# Patient Record
Sex: FEMALE | Race: WHITE | NOT HISPANIC OR LATINO | URBAN - METROPOLITAN AREA
[De-identification: names, ages, dates, MRNs, and addresses within clinical notes are randomized per-mention and may not be internally consistent; named-entity substitution may affect disease eponyms.]

---

## 2018-03-09 ENCOUNTER — OUTPATIENT (OUTPATIENT)
Dept: OUTPATIENT SERVICES | Facility: HOSPITAL | Age: 58
LOS: 1 days | End: 2018-03-09

## 2018-03-14 ENCOUNTER — OUTPATIENT (OUTPATIENT)
Dept: OUTPATIENT SERVICES | Facility: HOSPITAL | Age: 58
LOS: 1 days | End: 2018-03-14

## 2018-03-28 ENCOUNTER — OUTPATIENT (OUTPATIENT)
Dept: OUTPATIENT SERVICES | Facility: HOSPITAL | Age: 58
LOS: 1 days | End: 2018-03-28

## 2020-09-22 ENCOUNTER — APPOINTMENT (OUTPATIENT)
Dept: RADIOLOGY | Facility: CLINIC | Age: 60
End: 2020-09-22

## 2020-09-22 ENCOUNTER — APPOINTMENT (OUTPATIENT)
Dept: ULTRASOUND IMAGING | Facility: CLINIC | Age: 60
End: 2020-09-22
Payer: COMMERCIAL

## 2020-09-22 PROCEDURE — 73560 X-RAY EXAM OF KNEE 1 OR 2: CPT | Mod: 50

## 2020-09-22 PROCEDURE — 73100 X-RAY EXAM OF WRIST: CPT | Mod: 50

## 2020-09-22 PROCEDURE — 73120 X-RAY EXAM OF HAND: CPT | Mod: 50

## 2020-09-22 PROCEDURE — 76770 US EXAM ABDO BACK WALL COMP: CPT

## 2020-09-23 ENCOUNTER — INPATIENT (INPATIENT)
Facility: HOSPITAL | Age: 60
LOS: 3 days | Discharge: HOME | End: 2020-09-27
Admitting: INTERNAL MEDICINE
Payer: COMMERCIAL

## 2020-09-23 ENCOUNTER — OUTPATIENT (OUTPATIENT)
Dept: OUTPATIENT SERVICES | Facility: HOSPITAL | Age: 60
LOS: 1 days | End: 2020-09-23

## 2020-09-23 PROCEDURE — 74177 CT ABD & PELVIS W/CONTRAST: CPT | Mod: 26

## 2020-09-23 PROCEDURE — 71045 X-RAY EXAM CHEST 1 VIEW: CPT | Mod: 26

## 2020-09-23 PROCEDURE — 93010 ELECTROCARDIOGRAM REPORT: CPT

## 2020-09-23 PROCEDURE — 99285 EMERGENCY DEPT VISIT HI MDM: CPT

## 2020-09-24 ENCOUNTER — OUTPATIENT (OUTPATIENT)
Dept: OUTPATIENT SERVICES | Facility: HOSPITAL | Age: 60
LOS: 1 days | End: 2020-09-24

## 2020-09-25 ENCOUNTER — OUTPATIENT (OUTPATIENT)
Dept: OUTPATIENT SERVICES | Facility: HOSPITAL | Age: 60
LOS: 1 days | End: 2020-09-25

## 2020-09-26 ENCOUNTER — OUTPATIENT (OUTPATIENT)
Dept: OUTPATIENT SERVICES | Facility: HOSPITAL | Age: 60
LOS: 1 days | End: 2020-09-26

## 2020-09-27 ENCOUNTER — OUTPATIENT (OUTPATIENT)
Dept: OUTPATIENT SERVICES | Facility: HOSPITAL | Age: 60
LOS: 1 days | End: 2020-09-27

## 2021-02-24 ENCOUNTER — OUTPATIENT (OUTPATIENT)
Dept: OUTPATIENT SERVICES | Facility: HOSPITAL | Age: 61
LOS: 1 days | End: 2021-02-24

## 2021-02-28 DIAGNOSIS — Z01.818 ENCOUNTER FOR OTHER PREPROCEDURAL EXAMINATION: ICD-10-CM

## 2021-02-28 PROBLEM — Z00.00 ENCOUNTER FOR PREVENTIVE HEALTH EXAMINATION: Status: ACTIVE | Noted: 2021-02-28

## 2021-03-01 ENCOUNTER — APPOINTMENT (OUTPATIENT)
Dept: DISASTER EMERGENCY | Facility: CLINIC | Age: 61
End: 2021-03-01

## 2021-03-02 ENCOUNTER — TRANSCRIPTION ENCOUNTER (OUTPATIENT)
Age: 61
End: 2021-03-02

## 2021-03-02 LAB — SARS-COV-2 N GENE NPH QL NAA+PROBE: NOT DETECTED

## 2021-03-04 ENCOUNTER — OUTPATIENT (OUTPATIENT)
Dept: OUTPATIENT SERVICES | Facility: HOSPITAL | Age: 61
LOS: 1 days | End: 2021-03-04

## 2021-12-14 ENCOUNTER — OUTPATIENT (OUTPATIENT)
Dept: OUTPATIENT SERVICES | Facility: HOSPITAL | Age: 61
LOS: 1 days | End: 2021-12-14

## 2021-12-14 ENCOUNTER — INPATIENT (INPATIENT)
Facility: HOSPITAL | Age: 61
LOS: 1 days | Discharge: HOME | End: 2021-12-16
Payer: COMMERCIAL

## 2021-12-14 DIAGNOSIS — N13.6 PYONEPHROSIS: ICD-10-CM

## 2021-12-14 DIAGNOSIS — M06.9 RHEUMATOID ARTHRITIS, UNSPECIFIED: ICD-10-CM

## 2021-12-14 DIAGNOSIS — Z20.822 CONTACT WITH AND (SUSPECTED) EXPOSURE TO COVID-19: ICD-10-CM

## 2021-12-14 DIAGNOSIS — A41.9 SEPSIS, UNSPECIFIED ORGANISM: ICD-10-CM

## 2021-12-14 DIAGNOSIS — M32.9 SYSTEMIC LUPUS ERYTHEMATOSUS, UNSPECIFIED: ICD-10-CM

## 2021-12-14 DIAGNOSIS — F41.9 ANXIETY DISORDER, UNSPECIFIED: ICD-10-CM

## 2021-12-14 PROCEDURE — 99285 EMERGENCY DEPT VISIT HI MDM: CPT

## 2021-12-14 PROCEDURE — 71045 X-RAY EXAM CHEST 1 VIEW: CPT | Mod: 26

## 2021-12-14 PROCEDURE — 74177 CT ABD & PELVIS W/CONTRAST: CPT | Mod: 26

## 2021-12-15 ENCOUNTER — OUTPATIENT (OUTPATIENT)
Dept: OUTPATIENT SERVICES | Facility: HOSPITAL | Age: 61
LOS: 1 days | End: 2021-12-15

## 2021-12-16 ENCOUNTER — OUTPATIENT (OUTPATIENT)
Dept: OUTPATIENT SERVICES | Facility: HOSPITAL | Age: 61
LOS: 1 days | End: 2021-12-16

## 2021-12-28 ENCOUNTER — NON-APPOINTMENT (OUTPATIENT)
Age: 61
End: 2021-12-28

## 2021-12-30 ENCOUNTER — APPOINTMENT (OUTPATIENT)
Dept: FAMILY MEDICINE | Facility: CLINIC | Age: 61
End: 2021-12-30
Payer: COMMERCIAL

## 2021-12-30 ENCOUNTER — APPOINTMENT (OUTPATIENT)
Dept: UROLOGY | Facility: CLINIC | Age: 61
End: 2021-12-30
Payer: COMMERCIAL

## 2021-12-30 ENCOUNTER — NON-APPOINTMENT (OUTPATIENT)
Age: 61
End: 2021-12-30

## 2021-12-30 VITALS
SYSTOLIC BLOOD PRESSURE: 115 MMHG | TEMPERATURE: 97.6 F | HEART RATE: 66 BPM | WEIGHT: 130 LBS | BODY MASS INDEX: 24.55 KG/M2 | HEIGHT: 61 IN | DIASTOLIC BLOOD PRESSURE: 79 MMHG

## 2021-12-30 DIAGNOSIS — Z87.39 PERSONAL HISTORY OF OTHER DISEASES OF THE MUSCULOSKELETAL SYSTEM AND CONNECTIVE TISSUE: ICD-10-CM

## 2021-12-30 DIAGNOSIS — M32.9 SYSTEMIC LUPUS ERYTHEMATOSUS, UNSPECIFIED: ICD-10-CM

## 2021-12-30 DIAGNOSIS — Z78.9 OTHER SPECIFIED HEALTH STATUS: ICD-10-CM

## 2021-12-30 DIAGNOSIS — M25.619 STIFFNESS OF UNSPECIFIED SHOULDER, NOT ELSEWHERE CLASSIFIED: ICD-10-CM

## 2021-12-30 DIAGNOSIS — Z86.59 PERSONAL HISTORY OF OTHER MENTAL AND BEHAVIORAL DISORDERS: ICD-10-CM

## 2021-12-30 DIAGNOSIS — Z86.69 PERSONAL HISTORY OF OTHER DISEASES OF THE NERVOUS SYSTEM AND SENSE ORGANS: ICD-10-CM

## 2021-12-30 PROCEDURE — 99204 OFFICE O/P NEW MOD 45 MIN: CPT

## 2021-12-30 PROCEDURE — 36415 COLL VENOUS BLD VENIPUNCTURE: CPT

## 2021-12-30 RX ORDER — BUPROPION HYDROCHLORIDE 300 MG/1
300 TABLET, EXTENDED RELEASE ORAL DAILY
Refills: 0 | Status: ACTIVE | COMMUNITY

## 2021-12-30 NOTE — ASSESSMENT
[FreeTextEntry1] : Plan\par UA\par culture\par BMP\par pt to try and bring older CT scans\par fu with Dr. Arias

## 2021-12-30 NOTE — LETTER BODY
[Dear  ___] : Dear  [unfilled], [Consult Letter:] : I had the pleasure of evaluating your patient, [unfilled]. [Please see my note below.] : Please see my note below. [Consult Closing:] : Thank you very much for allowing me to participate in the care of this patient.  If you have any questions, please do not hesitate to contact me. [Sincerely,] : Sincerely, [FreeTextEntry3] : Alton Bhatti, DO\par Genitourinary Medicine\par

## 2021-12-30 NOTE — HISTORY OF PRESENT ILLNESS
[None] : no symptoms [Urinary Urgency] : urinary urgency [Urinary Frequency] : urinary frequency [FreeTextEntry1] : Pt. is here today with a diagnosis of a UPJ obstruction that was found about 1 year ago. Pt was hospitalized last year for a UTI and became septic and was transported to [facility] [place] and was admitted for 4 days. Pt currently not in any pain. December CT from [facility] [place] images reviewed.

## 2022-01-03 LAB
ANION GAP SERPL CALC-SCNC: 11 MMOL/L
APPEARANCE: CLEAR
BACTERIA UR CULT: NORMAL
BACTERIA: NEGATIVE
BILIRUBIN URINE: NEGATIVE
BLOOD URINE: NEGATIVE
BUN SERPL-MCNC: 18 MG/DL
CALCIUM SERPL-MCNC: 10.2 MG/DL
CHLORIDE SERPL-SCNC: 102 MMOL/L
CO2 SERPL-SCNC: 27 MMOL/L
COLOR: YELLOW
CREAT SERPL-MCNC: 1.09 MG/DL
GLUCOSE QUALITATIVE U: NEGATIVE
GLUCOSE SERPL-MCNC: 95 MG/DL
HYALINE CASTS: 0 /LPF
KETONES URINE: NEGATIVE
LEUKOCYTE ESTERASE URINE: NEGATIVE
MICROSCOPIC-UA: NORMAL
NITRITE URINE: NEGATIVE
PH URINE: 8.5
POTASSIUM SERPL-SCNC: 5.1 MMOL/L
PROTEIN URINE: NORMAL
RED BLOOD CELLS URINE: 2 /HPF
SODIUM SERPL-SCNC: 140 MMOL/L
SPECIFIC GRAVITY URINE: 1.02
SQUAMOUS EPITHELIAL CELLS: 5 /HPF
UROBILINOGEN URINE: NORMAL
WHITE BLOOD CELLS URINE: 1 /HPF

## 2022-01-05 ENCOUNTER — APPOINTMENT (OUTPATIENT)
Dept: UROLOGY | Facility: CLINIC | Age: 62
End: 2022-01-05
Payer: COMMERCIAL

## 2022-01-05 VITALS
WEIGHT: 130 LBS | DIASTOLIC BLOOD PRESSURE: 81 MMHG | BODY MASS INDEX: 24.55 KG/M2 | TEMPERATURE: 97.8 F | HEART RATE: 67 BPM | HEIGHT: 61 IN | SYSTOLIC BLOOD PRESSURE: 116 MMHG

## 2022-01-05 DIAGNOSIS — N12 TUBULO-INTERSTITIAL NEPHRITIS, NOT SPECIFIED AS ACUTE OR CHRONIC: ICD-10-CM

## 2022-01-05 DIAGNOSIS — N13.30 UNSPECIFIED HYDRONEPHROSIS: ICD-10-CM

## 2022-01-05 PROCEDURE — 99215 OFFICE O/P EST HI 40 MIN: CPT

## 2022-01-05 NOTE — PHYSICAL EXAM

## 2022-01-05 NOTE — HISTORY OF PRESENT ILLNESS
[None] : no symptoms [Urinary Urgency] : urinary urgency [Urinary Frequency] : urinary frequency [FreeTextEntry1] : 61 female with a diagnosis of a right sided UPJ obstruction that was found about 1 year ago - in 2020\par \par Pt was hospitalized last year for a UTI and became septic and was transported to [facility] and was admitted for 4 days.\par Within 14 months: twice admission to hospital for sepsis - needed IV antibiotics.\par Multiple CT's from 2014 to 2020 to 2021:\par All CT's show severe right hydro due to UPJ. Severe cortical thinning. \par MAG scan in 02/2021 at [facility]:\par T1/2 right side : not calculated: no excretion due to right UPJ   vs. Left side T1/2= 6 mins normal\par Diff function: right side 56% vs 44% left side (aberrant ??? due to obstruction function is false?)\par \par Dec 30, 2021: Cr =1 // GFR 56\par UCx: dec 30 2021: neg\par \par

## 2022-01-05 NOTE — LETTER BODY
[Dear  ___] : Dear  [unfilled], [Consult Letter:] : I had the pleasure of evaluating your patient, [unfilled]. [Please see my note below.] : Please see my note below. [Consult Closing:] : Thank you very much for allowing me to participate in the care of this patient.  If you have any questions, please do not hesitate to contact me. [Sincerely,] : Sincerely, [FreeTextEntry3] : Kerrie Arias MD\par Urologic Oncology\par Robotic & Endoscopic urology\par [facility] [place]\par [facility]\par \par

## 2022-01-05 NOTE — ASSESSMENT
[FreeTextEntry1] : 61 female with a diagnosis of a right sided UPJ obstruction found since 2014\par Within 14 months: twice admission to hospital for sepsis - needed IV antibiotics.\par All CT's show severe right hydro due to UPJ. Severe cortical thinning. \par MAG scan in 02/2021 at [facility]: T1/2 right side : not calculated: no excretion due to right UPJ   vs. Left side T1/2= 6 mins normal . Diff function: right side 56% vs 44% left side (aberrant ??? due to obstruction function is false?)\par Cr =1 // GFR 56\par UCx: dec 30 2021: neg\par severe right CVA tend\par All images, CTs, NM MAG scans, from 2014 have been reviewed.\par Path discussed: UPJ obstruction, sepsis, atrophic right kidney. all risks and benefits explained with regards to options mentioned below. stent vs pyeloplasty vs right nephrectomy\par \par Plan\par Right ureteral stent insertion and repeat NM MAG scan in 4 weeks after.\par if function is adequate with stent and symptoms improve: could do robo right pyeloplasty\par if not, right nephrectomy

## 2022-02-01 DIAGNOSIS — A41.9 SEPSIS, UNSPECIFIED ORGANISM: ICD-10-CM

## 2022-02-01 DIAGNOSIS — N39.0 URINARY TRACT INFECTION, SITE NOT SPECIFIED: ICD-10-CM

## 2022-02-14 ENCOUNTER — OUTPATIENT (OUTPATIENT)
Dept: OUTPATIENT SERVICES | Facility: HOSPITAL | Age: 62
LOS: 1 days | End: 2022-02-14
Payer: COMMERCIAL

## 2022-02-14 PROCEDURE — 93010 ELECTROCARDIOGRAM REPORT: CPT

## 2022-02-15 DIAGNOSIS — Z01.812 ENCOUNTER FOR PREPROCEDURAL LABORATORY EXAMINATION: ICD-10-CM

## 2022-02-15 DIAGNOSIS — N13.5 CROSSING VESSEL AND STRICTURE OF URETER WITHOUT HYDRONEPHROSIS: ICD-10-CM

## 2022-02-15 DIAGNOSIS — Z01.810 ENCOUNTER FOR PREPROCEDURAL CARDIOVASCULAR EXAMINATION: ICD-10-CM

## 2022-02-18 ENCOUNTER — APPOINTMENT (OUTPATIENT)
Dept: UROLOGY | Facility: HOSPITAL | Age: 62
End: 2022-02-18

## 2022-02-18 ENCOUNTER — OUTPATIENT (OUTPATIENT)
Dept: OUTPATIENT SERVICES | Facility: HOSPITAL | Age: 62
LOS: 1 days | End: 2022-02-18
Payer: COMMERCIAL

## 2022-02-18 PROCEDURE — 52332 CYSTOSCOPY AND TREATMENT: CPT | Mod: RT

## 2022-02-18 PROCEDURE — 74420 UROGRAPHY RTRGR +-KUB: CPT | Mod: 26

## 2022-02-25 DIAGNOSIS — N20.1 CALCULUS OF URETER: ICD-10-CM

## 2022-03-15 ENCOUNTER — OUTPATIENT (OUTPATIENT)
Dept: OUTPATIENT SERVICES | Facility: HOSPITAL | Age: 62
LOS: 1 days | End: 2022-03-15

## 2022-03-15 ENCOUNTER — NON-APPOINTMENT (OUTPATIENT)
Age: 62
End: 2022-03-15

## 2022-03-15 ENCOUNTER — APPOINTMENT (OUTPATIENT)
Dept: UROLOGY | Facility: CLINIC | Age: 62
End: 2022-03-15
Payer: COMMERCIAL

## 2022-03-15 VITALS
HEIGHT: 61 IN | HEART RATE: 73 BPM | BODY MASS INDEX: 24.55 KG/M2 | SYSTOLIC BLOOD PRESSURE: 105 MMHG | WEIGHT: 130 LBS | DIASTOLIC BLOOD PRESSURE: 68 MMHG | TEMPERATURE: 97.5 F

## 2022-03-15 DIAGNOSIS — T83.84XA PAIN DUE GENIURINARY PROSTHETIC DEVICES, IMPLANTS AND GRAFTS, INITIAL ENCOUNTER: ICD-10-CM

## 2022-03-15 DIAGNOSIS — Z53.8 PROCEDURE AND TREATMENT NOT CARRIED OUT FOR OTHER REASONS: ICD-10-CM

## 2022-03-15 DIAGNOSIS — N39.0 URINARY TRACT INFECTION, SITE NOT SPECIFIED: ICD-10-CM

## 2022-03-15 DIAGNOSIS — R30.0 DYSURIA: ICD-10-CM

## 2022-03-15 LAB
BILIRUB UR QL STRIP: NORMAL
CLARITY UR: CLEAR
COLLECTION METHOD: NORMAL
GLUCOSE UR-MCNC: NORMAL
HCG UR QL: 0.2 EU/DL
HGB UR QL STRIP.AUTO: NORMAL
KETONES UR-MCNC: NORMAL
LEUKOCYTE ESTERASE UR QL STRIP: NORMAL
NITRITE UR QL STRIP: NORMAL
PH UR STRIP: 7
PROT UR STRIP-MCNC: 100
SP GR UR STRIP: 1.01

## 2022-03-15 PROCEDURE — 99214 OFFICE O/P EST MOD 30 MIN: CPT

## 2022-03-15 PROCEDURE — 81003 URINALYSIS AUTO W/O SCOPE: CPT | Mod: QW

## 2022-03-15 NOTE — HISTORY OF PRESENT ILLNESS
[FreeTextEntry1] : Pt comes in bc of lower back pain, dark urine, cloudy bubbly. Pt was suppose to renal scan today but it was canceled bc of her UTI symptoms. Dr. Arias placed a right ureteral stent on 2/18/22.

## 2022-03-17 LAB
APPEARANCE: CLEAR
BACTERIA UR CULT: NORMAL
BACTERIA: NEGATIVE
BILIRUBIN URINE: NEGATIVE
BLOOD URINE: ABNORMAL
COLOR: NORMAL
GLUCOSE QUALITATIVE U: NEGATIVE
HYALINE CASTS: 2 /LPF
KETONES URINE: NEGATIVE
LEUKOCYTE ESTERASE URINE: ABNORMAL
MICROSCOPIC-UA: NORMAL
NITRITE URINE: NEGATIVE
PH URINE: 7
PROTEIN URINE: ABNORMAL
RED BLOOD CELLS URINE: 1 /HPF
SPECIFIC GRAVITY URINE: 1.01
SQUAMOUS EPITHELIAL CELLS: 0 /HPF
UROBILINOGEN URINE: NORMAL
WHITE BLOOD CELLS URINE: 34 /HPF

## 2022-03-23 ENCOUNTER — RESULT REVIEW (OUTPATIENT)
Age: 62
End: 2022-03-23

## 2022-03-23 ENCOUNTER — OUTPATIENT (OUTPATIENT)
Dept: OUTPATIENT SERVICES | Facility: HOSPITAL | Age: 62
LOS: 1 days | End: 2022-03-23
Payer: COMMERCIAL

## 2022-03-23 DIAGNOSIS — N13.30 UNSPECIFIED HYDRONEPHROSIS: ICD-10-CM

## 2022-03-23 PROCEDURE — 78708 K FLOW/FUNCT IMAGE W/DRUG: CPT | Mod: 26

## 2022-04-04 ENCOUNTER — APPOINTMENT (OUTPATIENT)
Dept: UROLOGY | Facility: CLINIC | Age: 62
End: 2022-04-04
Payer: COMMERCIAL

## 2022-04-04 VITALS
HEART RATE: 76 BPM | HEIGHT: 61 IN | TEMPERATURE: 92.3 F | SYSTOLIC BLOOD PRESSURE: 120 MMHG | DIASTOLIC BLOOD PRESSURE: 73 MMHG | WEIGHT: 130 LBS | OXYGEN SATURATION: 99 % | BODY MASS INDEX: 24.55 KG/M2

## 2022-04-04 DIAGNOSIS — N28.9 DISORDER OF KIDNEY AND URETER, UNSPECIFIED: ICD-10-CM

## 2022-04-04 DIAGNOSIS — N13.30 UNSPECIFIED HYDRONEPHROSIS: ICD-10-CM

## 2022-04-04 DIAGNOSIS — N13.5 CROSSING VESSEL AND STRICTURE OF URETER W/OUT HYDRONEPHROSIS: ICD-10-CM

## 2022-04-04 PROCEDURE — 99214 OFFICE O/P EST MOD 30 MIN: CPT

## 2022-04-04 NOTE — ASSESSMENT
[FreeTextEntry1] : Patient had 2 episodes of sever right pyonephrosis with urosepsis\par CT was done in December 2021 that showed normal left kidney and right kidney with sever hydronephrosis and very thin parenchyma\par NM Renal scan in 2021 showed decreased perfusion of thin cortical rim of right kidney and complete obstruction of UPJ. Left kidney is normal\par Patient got right double J stent and returned on NM Renal scan that was done with MAG -3. The "recalculated" results of renal scan showed that left kidney  contributes 72% and right kidney donates 28% of mutual renal function. No response on Lasix identified on the right side - this is with the presence of Right Ureteral stent. This also support the conclusion that the right kidney is a poorly functioning renal unit.\par \par I explained to the patient that MAG -3 could not accurately calculate the function of right kidney. To assess the accurate function DMSA would be a better chance.\par However, based on the results of CT and both NM renal scan we can consider right kidney as a poorly functioning I explained that the results of UPJ plasty is worse in the cases of poorly functioning kidney and alsothe age has a certain negative impact\par I recommended to consider right nephrectomy .\par Sending patient for the second opinion and operation\par \par

## 2022-04-04 NOTE — PHYSICAL EXAM

## 2022-04-04 NOTE — HISTORY OF PRESENT ILLNESS
[FreeTextEntry1] : 62 year-old patient presented to discuss the results of NM REnal scan with Lasix (MAG-3)\par Patient was previously seen in our office:\par had 2 episodes of sever right pyonephrosis with urosepsis\par CT was done in December 2021 that showed normal left kidney and right kidney with sever hydronephrosis and very thin parenchyma\par NM Renal scan in 2021 showed decreased perfusion of thin cortical rim of right kidney and complete obstruction of UPJ. Left kidney is normal\par Patient got right double J stent and returned on NM Renal scan that was done with MAG -3

## 2022-04-13 ENCOUNTER — APPOINTMENT (OUTPATIENT)
Dept: UROLOGY | Facility: CLINIC | Age: 62
End: 2022-04-13

## 2023-07-12 ENCOUNTER — APPOINTMENT (OUTPATIENT)
Dept: ULTRASOUND IMAGING | Facility: CLINIC | Age: 63
End: 2023-07-12
Payer: COMMERCIAL

## 2023-07-12 PROCEDURE — 76770 US EXAM ABDO BACK WALL COMP: CPT

## 2024-02-05 ENCOUNTER — APPOINTMENT (OUTPATIENT)
Dept: ORTHOPEDIC SURGERY | Facility: CLINIC | Age: 64
End: 2024-02-05
Payer: COMMERCIAL

## 2024-02-05 VITALS — HEIGHT: 61 IN | WEIGHT: 134 LBS | BODY MASS INDEX: 25.3 KG/M2

## 2024-02-05 DIAGNOSIS — Z87.19 PERSONAL HISTORY OF OTHER DISEASES OF THE DIGESTIVE SYSTEM: ICD-10-CM

## 2024-02-05 DIAGNOSIS — Z86.59 PERSONAL HISTORY OF OTHER MENTAL AND BEHAVIORAL DISORDERS: ICD-10-CM

## 2024-02-05 PROCEDURE — ZZZZZ: CPT

## 2024-02-05 RX ORDER — FLUOXETINE HYDROCHLORIDE 20 MG/1
20 CAPSULE ORAL
Refills: 0 | Status: ACTIVE | COMMUNITY

## 2024-02-05 RX ORDER — CEPHALEXIN 500 MG/1
500 CAPSULE ORAL
Qty: 14 | Refills: 0 | Status: DISCONTINUED | COMMUNITY
Start: 2022-03-15 | End: 2024-02-05

## 2024-02-05 RX ORDER — TAMSULOSIN HYDROCHLORIDE 0.4 MG/1
0.4 CAPSULE ORAL
Qty: 30 | Refills: 1 | Status: DISCONTINUED | COMMUNITY
Start: 2022-03-15 | End: 2024-02-05

## 2024-02-05 RX ORDER — FOLIC ACID 20 MG
CAPSULE ORAL DAILY
Refills: 0 | Status: DISCONTINUED | COMMUNITY
End: 2024-02-05

## 2024-02-05 RX ORDER — LUBIPROSTONE 24 UG/1
24 CAPSULE ORAL
Refills: 0 | Status: ACTIVE | COMMUNITY

## 2024-02-05 RX ORDER — METHOTREXATE 2.5 MG/1
2.5 TABLET ORAL
Qty: 1 | Refills: 0 | Status: DISCONTINUED | COMMUNITY
End: 2024-02-05

## 2024-02-05 NOTE — HISTORY OF PRESENT ILLNESS
[5] : 5 [3] : 3 [Dull/Aching] : dull/aching [Radiating] : radiating [Tightness] : tightness [Constant] : constant [Massage] : massage [Physical therapy] : physical therapy [Injection therapy] : injection therapy [Retired] : Work status: retired [de-identified] : 02/05/2024 - 63-year-old female is presenting for an initial evaluation with a complaint of left greater than right-sided neck pain persisting for several years. The neck pain does not radiate into the upper extremities, and there is no reported change in upper extremity strength, numbness, or tingling. She describes the pain as a constant sensation of stiffness and tightness. The pain worsens with cervical extension and at nighttime. Over the past two years, she has undergone various treatments, including acupuncture, physical therapy, trigger point injection therapy, and medical massage. While all these interventions provide temporary relief at the time, the pain persists. She states prior treatment with Naproxen was helpful, but she is no longer treating with this. NSAID was found to be affecting renal function so she has discontinued use with NSAID. Her overall general medical health is reported as good. [] : no [FreeTextEntry7] : bl shoulders [de-identified] : acupuncture, deep tissue massage, chiropractor, osteopathic-tpi [de-identified] : mri c-spine doarchie at

## 2024-02-05 NOTE — DATA REVIEWED
[MRI] : MRI [Cervical Spine] : cervical spine [Report was reviewed and noted in the chart] : The report was reviewed and noted in the chart [I reviewed the films/CD and additionally noted] : I reviewed the films/CD and additionally noted [I independently reviewed and interpreted images and report] : I independently reviewed and interpreted images and report [FreeTextEntry2] : in office x-rays cervical spine ap/lat  flex ex demonstrate extension there is 1 mm anterolisthesis C3/4 and in flexion there is 3 mm.   [FreeTextEntry1] : I stop paperwork reviewed

## 2024-02-05 NOTE — DISCUSSION/SUMMARY
[de-identified] : MRI - cervical MRI nanci c3/4 sev rt mild lt foraminal narrowing. c4/5 mod lt foraminal narrowing. c5/6 sev b/l foraminal narrowing. c6/7 sev lt foraminal narrowing.  Xray today cervical, extension there is 1 mm anterolisthesis C3/4 and in flexion there is 3 mm.  Plan: At this time, in the absence of upper extremity radicular pain, I do not think he would be a good candidate for cervical fusion surgery to address, primarily axial neck pain. Will remain conservative at this time. More urgency for surgery (ACDF) if there is onset of motor or neurological deficits. Given the persistent nature of the neck pain, the next recommended course of treatment would involve interventional injections, specifically medial branch blocks, possible RFA. (C3-4, C5-6,6-7 are potential pain generators) A referral has been issued. Use of NSAIDs is limited due to their impact on renal function in this patient.   Prior to appointment and during encounter with patient extensive medical records were reviewed including but not limited to, hospital records, outpatient records, imaging results, and lab data. During this appointment the patient was examined, diagnoses were discussed and explained in a face to face manner. In addition extensive time was spent reviewing aforementioned diagnostic studies. Counseling including abnormal image results, differential diagnoses, treatment options, risk and benefits, lifestyle changes, current condition, and current medications was performed. Patient's comments, questions, and concerns were addressed and patient verbalized understanding. Based on this patient's presentation at our office, which is an orthopedic spine surgeon's office, this patient inherently / intrinsically has a risk, however minute, of developing issues such as Cauda equina syndrome, bowel and bladder changes, or progression of motor or neurological deficits such as paralysis which may be permanent.  SHANNAN MICHAEL Acting as a Scribe for Dr. Ian HOWARD, Shannan Michael, attest that this documentation has been prepared under the direction and in the presence of Provider Antoine Sosa MD.

## 2024-02-05 NOTE — PHYSICAL EXAM
[Normal Coordination] : normal coordination [Normal DTR UE/LE] : normal DTR UE/LE  [Normal Sensation] : normal sensation [Normal Mood and Affect] : normal mood and affect [Orientated] : orientated [Able to Communicate] : able to communicate [Normal Skin] : normal skin [No Rash] : no rash [No Ulcers] : no ulcers [No Lesions] : no lesions [No obvious lymphadenopathy in areas examined] : no obvious lymphadenopathy in areas examined [Well Developed] : well developed [Peripheral vascular exam is grossly normal] : peripheral vascular exam is grossly normal [No Respiratory Distress] : no respiratory distress [NL (45)] : right lateral flexion 45 degrees [NL (80)] : right lateral rotation 80 degrees [Extension] : extension [5___] : right grasp 5[unfilled]/5 [Biceps 2+] : biceps 2+ [Triceps 2+] : triceps 2+ [Brachioradialis 2+] : brachioradialis 2+ [] : no rhomboid tenderness [FreeTextEntry9] : stiffness in all planes

## 2024-03-11 ENCOUNTER — APPOINTMENT (OUTPATIENT)
Dept: PAIN MANAGEMENT | Facility: CLINIC | Age: 64
End: 2024-03-11
Payer: COMMERCIAL

## 2024-03-11 VITALS — BODY MASS INDEX: 25.49 KG/M2 | WEIGHT: 135 LBS | HEIGHT: 61 IN

## 2024-03-11 PROCEDURE — 99203 OFFICE O/P NEW LOW 30 MIN: CPT

## 2024-03-11 NOTE — ASSESSMENT
[FreeTextEntry1] : A discussion regarding available pain management treatment options occurred with the patient.  These included interventional, rehabilitative, pharmacological, and alternative modalities. We will proceed with the following:    Interventional treatment options:   - Proceed with left PM C7-T1 MELISSA with fluoroscopic guidance - Can consider left facet direct intervention for ongoing axial neck pain - Failed TPI therapy with outside provider - see additional instructions below    Rehabilitative options:   - Failed recent physical therapy trial - participation in active HEP was discussed    Medication based treatment options:   - Not candidate for oral NSAIDs secondary to CKD - continue topical OTC analgesics on as-needed basis - see additional instructions below    Complementary treatment options:   - Weight management and lifestyle modifications discussed   - Short-lived relief with previous acupuncture and chiropractic trials - Cervical traction - prescription provided  Additional treatment recommendations as follows:   - patient will follow-up with Dr. Sosa as directed - Follow up 1-2 weeks post injection for assessment of efficacy and further treatment recommendations  The risks, benefits and alternatives of the proposed procedure were explained in detail with the patient. The risks outlined include but are not limited to infection, bleeding, post- dural puncture headache, nerve injury, a temporary increase in pain, failure to resolve symptoms, need for future interventions, allergic reaction, and possible elevation of blood sugar in diabetics if using corticosteroid.  All questions were answered to patient's apparent satisfaction, and he/she verbalized an understanding.  The documentation recorded by the scribe, in my presence, accurately reflects the service I personally performed and the decisions made by me with my edits as appropriate.   I, Babak Jaime acting as scribe, attest that this documentation has been prepared under the direction and in the presence of Provider Elias Jaramillo DO.

## 2024-03-11 NOTE — HISTORY OF PRESENT ILLNESS
[Neck] : neck [Sudden] : sudden [6] : 6 [Dull/Aching] : dull/aching [Meds] : meds [Intermittent] : intermittent [Physical therapy] : physical therapy [FreeTextEntry1] : The patient presents for initial evaluation regarding their neck pain. Patient was referred by Dr. Sosa. Patient has a long-standing history of neck pain.  She attributes most of this to her underlying lupus and rheumatoid arthritis; currently sees rheumatology.  Patients pain is currently in the neck with radiation to the left upper trapezius and shoulder, he has had right sided pain in the past but currently her pain is left sided; her pain is worst at night and has paresthesias bilaterally in the hands. Patient has been through extensive conservative treatment including: PT, acupuncture, deep tissue massage, aqua therapy and TPI with short term relief.  She is not a candidate for NSAIDs secondary to kidney issues.   Subjective Weakness: No  Numbness/Tingling: Yes  Bladder/Bowel dysfunction: No  Gait Abnormalities: No  Fine motor coordination changes: No   Injections: Yes Multiple TPI  Pertinent Surgical History: N/A   Imagin) MRI Cervical Spine (10/4/2023) - [facility] C2-C3: No disc bulge, spinal canal or foraminal stenosis. C3-C4: There is uncovering of the disc with superimposed disc bulge eccentric to the right. There is right-sided predominant uncinate spurring. There is severe right and mild left facet joint arthrosis. There is mild spinal canal narrowing and bilateral, severe right and mild left foraminal narrowing. C4-C5: Disc bulge eccentric to the left with superimposed left foraminal zone disc protrusion. Moderate right and mild left facet arthrosis. There is mild right and moderate left foraminal narrowing. There is minimal canal narrowing. C5-C6: There is a disc osteophyte complex with uncinate spurring and facet hypertrophy. There is mild spinal canal narrowing and severe bilateral foraminal narrowing. C6-C7: There is a disc bulge with uncinate spurring and facet hypertrophy left greater than right. There is mild spinal canal narrowing and bilateral, mild right and severe left foraminal narrowing. C7-T1: No disc bulge, spinal canal or foraminal narrowing   Physician Disclaimer: I have personally reviewed and confirmed all HPI data with the patient.  [] : no [de-identified] : cervical mri at [facility]

## 2024-03-11 NOTE — PHYSICAL EXAM
[de-identified] : Constitutional:   - No acute distress   - Well developed; well nourished    Neurological:   - normal mood and affect   - alert and oriented x 3     Cardiovascular:   - grossly normal   Cervical Spine Exam:   Inspection:   erythema (-)   ecchymosis (-)   rashes (-)    Palpation:                                                    Cervical paraspinal tenderness:         R (-); L (-)  Upper trapezius tenderness:              R (+); L (+)  Rhomboids tenderness:                      R (-); L (-)  Occipital Ridge:                                    R (-); L (-)  Supraspinatus tenderness:                 R (-); L (-)   ROM: Reduced all planes  pain with extension and bilateral rotation  Strength Testing:              Deltoid                           R (5/5); L (5/5)  Biceps:                          R (5/5); L (5/5)  Triceps:                         R (5/5); L (5/5)  Finger Abductors:         R (5/5); L (5/5)  Grasp:                           R (5/5); L (5/5)   Special Testing:  Spurling Test:                  R (-); L (+)  Facet load test:               R (+); L (+)   Neuro:  Sensation to light touch reduced throughout left upper extremity  SILT throughout left upper extremity   Reflexes:  Biceps   -           R (2+); L (2+)  Triceps  -           R (2+); L (2+)  Brachioradialis- R (2+); L (2+)     No ankle clonus

## 2024-03-27 ENCOUNTER — APPOINTMENT (OUTPATIENT)
Dept: PAIN MANAGEMENT | Facility: CLINIC | Age: 64
End: 2024-03-27
Payer: COMMERCIAL

## 2024-03-27 PROCEDURE — 62321 NJX INTERLAMINAR CRV/THRC: CPT

## 2024-03-27 NOTE — PROCEDURE
[FreeTextEntry3] : Date of Service: 03/27/2024   Account: [id number]  Patient: AMISH OSORIO   YOB: 1960  Age: 64 year  Surgeon:      Elias Jaramillo DO  Assistant:    None  Pre-Operative Diagnosis:         Cervical Radiculopathy (M54.12)  Post Operative Diagnosis:       Cervical Radiculopathy (M54.12)  Procedure:             Cervical (C7-T1) interlaminar epidural steroid injection under fluoroscopic guidance  Anesthesia:  MAC  This procedure was carried out using fluoroscopic guidance.  The risks and benefits of the procedure were discussed extensively with the patient.  The consent of the patient was obtained and the following procedure was performed.  A timeout was performed with all essential staff present and the site and side were verified.  The patient was placed in the prone position and optimized to patient comfort.  The cervical area was prepped and draped in a sterile fashion.  The fluoroscope visualized the C7-T1 interspace using slight cephalad-caudad angulation and this area was marked.  Using sterile technique the superficial skin was anesthetized with 1% Lidocaine.  A 20 gauge 3.5 inch Tuohy needle was advanced under fluoroscopy until ligament was engaged.  Using a contralateral oblique view, a "loss of resistance" to air technique was utilized in order to gain access to the epidural space.  After negative aspiration for heme and CSF, 1 cc of Omnipaque contrast was administered and the appropriate cervical epidurogram was obtained in the LATOYA and A/P view as well as digital subtraction angiography.  A total injectate of 3 cc of preservative free normal saline and 40 mg of Kenalog was then injected into the epidural space while maintaining meaningful verbal contact with the patient.    Vital signs remained normal throughout the procedure.  The patient tolerated the procedure well.  There were no immediate complications from the performed procedure.  The patient was instructed to apply ice over the injection sites for twenty minutes every two hours for the next 24 hours.  Disposition:      1. The patient was advised to F/U in 1-2 weeks to assess the response to the injection.      2. The patient was also instructed to contact me immediately if there were any concerns related to the procedure performed.

## 2024-04-09 ENCOUNTER — APPOINTMENT (OUTPATIENT)
Dept: PAIN MANAGEMENT | Facility: CLINIC | Age: 64
End: 2024-04-09
Payer: COMMERCIAL

## 2024-04-09 VITALS — WEIGHT: 136 LBS | HEIGHT: 61 IN | BODY MASS INDEX: 25.68 KG/M2

## 2024-04-09 DIAGNOSIS — M48.02 SPINAL STENOSIS, CERVICAL REGION: ICD-10-CM

## 2024-04-09 DIAGNOSIS — M54.12 RADICULOPATHY, CERVICAL REGION: ICD-10-CM

## 2024-04-09 DIAGNOSIS — M79.18 MYALGIA, OTHER SITE: ICD-10-CM

## 2024-04-09 DIAGNOSIS — M47.812 SPONDYLOSIS W/OUT MYELOPATHY OR RADICULOPATHY, CERVICAL REGION: ICD-10-CM

## 2024-04-09 PROCEDURE — 99214 OFFICE O/P EST MOD 30 MIN: CPT

## 2024-04-09 NOTE — ASSESSMENT
[FreeTextEntry1] : A discussion regarding available pain management treatment options occurred with the patient.  These included interventional, rehabilitative, pharmacological, and alternative modalities. We will proceed with the following:    Interventional treatment options:   - Would proceed with repeat left PM C7-T1 MELISSA (80 mg Kenalog) with return of severe neck/radicular pain-will call - Can consider left facet direct intervention for ongoing axial neck pain - Failed TPI therapy with outside provider - see additional instructions below    Rehabilitative options:   - Failed recent physical therapy trial - participation in active HEP was discussed - Home exercise sheet provided  Medication based treatment options:   - Not candidate for oral NSAIDs secondary to CKD - continue topical OTC analgesics on as-needed basis - see additional instructions below    Complementary treatment options:   - Weight management and lifestyle modifications discussed   - Short-lived relief with previous acupuncture and chiropractic trials - Cervical traction - prescription provided  Additional treatment recommendations as follows:   - patient will follow-up with Dr. Sosa as directed - Follow-up for repeat injection or 3 months  The risks, benefits and alternatives of the proposed procedure were explained in detail with the patient. The risks outlined include but are not limited to infection, bleeding, post- dural puncture headache, nerve injury, a temporary increase in pain, failure to resolve symptoms, need for future interventions, allergic reaction, and possible elevation of blood sugar in diabetics if using corticosteroid.  All questions were answered to patient's apparent satisfaction, and he/she verbalized an understanding.  The documentation recorded by the scribe, in my presence, accurately reflects the service I personally performed and the decisions made by me with my edits as appropriate.   I, Babak Jaime acting as scribe, attest that this documentation has been prepared under the direction and in the presence of Provider Elias Jaramillo DO.

## 2024-04-09 NOTE — PHYSICAL EXAM
[de-identified] : Constitutional:   - No acute distress   - Well developed; well nourished    Neurological:   - normal mood and affect   - alert and oriented x 3     Cardiovascular:   - grossly normal   Cervical Spine Exam:   Inspection:   erythema (-)   ecchymosis (-)   rashes (-)    Palpation:                                                    Cervical paraspinal tenderness:         R (-); L (-)  Upper trapezius tenderness:              R (+); L (+)  Rhomboids tenderness:                      R (-); L (-)  Occipital Ridge:                                    R (-); L (-)  Supraspinatus tenderness:                 R (-); L (-)   ROM: WNL; stiffness throughout ROM testing pain with extremes of extension  Strength Testing:              Deltoid                           R (5/5); L (5/5)  Biceps:                          R (5/5); L (5/5)  Triceps:                         R (5/5); L (5/5)  Finger Abductors:         R (5/5); L (5/5)  Grasp:                           R (5/5); L (5/5)   Special Testing:  Spurling Test:                  R (-); L (=)  Facet load test:               R (+); L (+)   Neuro:  Sensation to light touch reduced throughout left upper extremity  SILT throughout left upper extremity   Reflexes:  Biceps   -           R (2+); L (2+)  Triceps  -           R (2+); L (2+)  Brachioradialis- R (2+); L (2+)     No ankle clonus

## 2024-04-09 NOTE — HISTORY OF PRESENT ILLNESS
[Neck] : neck [Sudden] : sudden [6] : 6 [Dull/Aching] : dull/aching [Intermittent] : intermittent [Meds] : meds [Physical therapy] : physical therapy [FreeTextEntry1] : 2024 - Patient presents for FUV after a C7-T1 MELISSA on 3/27/2024. Patient reports a significant reduction of pain s/p injection (75% overall pain reduction), her ROM has improved, and she is able to lay down and sleep more comfortably.   Overall she is very pleased with her response.  3/11/2024 - The patient presents for initial evaluation regarding their neck pain. Patient was referred by Dr. Sosa. Patient has a long-standing history of neck pain.  She attributes most of this to her underlying lupus and rheumatoid arthritis; currently sees rheumatology.  Patients pain is currently in the neck with radiation to the left upper trapezius and shoulder, he has had right sided pain in the past but currently her pain is left sided; her pain is worst at night and has paresthesias bilaterally in the hands. Patient has been through extensive conservative treatment including: PT, acupuncture, deep tissue massage, aqua therapy and TPI with short term relief.  She is not a candidate for NSAIDs secondary to kidney issues.   Injections: 1) TPI 2) C7-T1 MELISSA (3/27/2024)  Pertinent Surgical History: N/A   Imagin) MRI Cervical Spine (10/4/2023) - [facility] C2-C3: No disc bulge, spinal canal or foraminal stenosis. C3-C4: There is uncovering of the disc with superimposed disc bulge eccentric to the right. There is right-sided predominant uncinate spurring. There is severe right and mild left facet joint arthrosis. There is mild spinal canal narrowing and bilateral, severe right and mild left foraminal narrowing. C4-C5: Disc bulge eccentric to the left with superimposed left foraminal zone disc protrusion. Moderate right and mild left facet arthrosis. There is mild right and moderate left foraminal narrowing. There is minimal canal narrowing. C5-C6: There is a disc osteophyte complex with uncinate spurring and facet hypertrophy. There is mild spinal canal narrowing and severe bilateral foraminal narrowing. C6-C7: There is a disc bulge with uncinate spurring and facet hypertrophy left greater than right. There is mild spinal canal narrowing and bilateral, mild right and severe left foraminal narrowing. C7-T1: No disc bulge, spinal canal or foraminal narrowing   Physician Disclaimer: I have personally reviewed and confirmed all HPI data with the patient.  [] : Patient is currently playing sports: no [de-identified] : cervical mri at [facility]

## 2024-07-16 ENCOUNTER — APPOINTMENT (OUTPATIENT)
Dept: PAIN MANAGEMENT | Facility: CLINIC | Age: 64
End: 2024-07-16

## 2025-03-04 ENCOUNTER — OFFICE (OUTPATIENT)
Dept: URBAN - METROPOLITAN AREA CLINIC 38 | Facility: CLINIC | Age: 65
Setting detail: OPHTHALMOLOGY
End: 2025-03-04
Payer: COMMERCIAL

## 2025-03-04 DIAGNOSIS — H16.221: ICD-10-CM

## 2025-03-04 DIAGNOSIS — H52.4: ICD-10-CM

## 2025-03-04 DIAGNOSIS — H43.393: ICD-10-CM

## 2025-03-04 DIAGNOSIS — Z79.899: ICD-10-CM

## 2025-03-04 DIAGNOSIS — H25.13: ICD-10-CM

## 2025-03-04 PROCEDURE — 92134 CPTRZ OPH DX IMG PST SGM RTA: CPT | Performed by: OPHTHALMOLOGY

## 2025-03-04 PROCEDURE — 92004 COMPRE OPH EXAM NEW PT 1/>: CPT | Performed by: OPHTHALMOLOGY

## 2025-03-04 PROCEDURE — 92015 DETERMINE REFRACTIVE STATE: CPT | Performed by: OPHTHALMOLOGY

## 2025-03-04 ASSESSMENT — REFRACTION_CURRENTRX
OS_ADD: +1.75
OS_OVR_VA: 20/
OD_VPRISM_DIRECTION: BF
OD_SPHERE: -1.25
OS_CYLINDER: -0.25
OD_AXIS: 178
OS_AXIS: 003
OS_SPHERE: -1.50
OD_ADD: +1.75
OD_OVR_VA: 20/
OD_CYLINDER: -0.50
OS_VPRISM_DIRECTION: BF

## 2025-03-04 ASSESSMENT — KERATOMETRY
OD_K1POWER_DIOPTERS: 41.75
OS_AXISANGLE_DEGREES: 109
OS_K2POWER_DIOPTERS: 42.50
OS_K1POWER_DIOPTERS: 42.25
OD_AXISANGLE_DEGREES: 100
METHOD_AUTO_MANUAL: AUTO
OD_K2POWER_DIOPTERS: 42.25

## 2025-03-04 ASSESSMENT — REFRACTION_MANIFEST
OS_SPHERE: -1.50
OS_VA2: 20/20
OS_SPHERE: -1.00
OS_CYLINDER: SPH
OD_ADD: +2.00
OS_ADD: +2.75
OU_VA: 20/20
OD_AXIS: 025
OS_VA1: 20/20
OD_SPHERE: -0.75
OD_VA1: 20/20-
OU_VA: 20/20
OD_VA2: 20/20
OD_CYLINDER: -0.50
OS_ADD: +2.00
OS_VA2: 20/20
OD_SPHERE: -0.75
OS_CYLINDER: SPH
OD_ADD: +2.75
OD_AXIS: 025
OS_VA1: 20/20
OD_VA2: 20/20
OD_CYLINDER: -0.75
OD_VA1: 20/20-

## 2025-03-04 ASSESSMENT — REFRACTION_AUTOREFRACTION
OD_SPHERE: -0.50
OS_SPHERE: -1.00
OD_CYLINDER: -0.50
OS_CYLINDER: -0.25
OD_AXIS: 024
OS_AXIS: 024

## 2025-03-04 ASSESSMENT — VISUAL ACUITY
OS_BCVA: 20/50-
OD_BCVA: 20/70

## 2025-03-04 ASSESSMENT — CONFRONTATIONAL VISUAL FIELD TEST (CVF)
OD_FINDINGS: FULL
OS_FINDINGS: FULL

## 2025-03-11 ENCOUNTER — NON-APPOINTMENT (OUTPATIENT)
Age: 65
End: 2025-03-11

## 2025-03-12 ENCOUNTER — NON-APPOINTMENT (OUTPATIENT)
Age: 65
End: 2025-03-12

## 2025-03-13 ENCOUNTER — APPOINTMENT (OUTPATIENT)
Dept: CARDIOLOGY | Facility: CLINIC | Age: 65
End: 2025-03-13
Payer: COMMERCIAL

## 2025-03-13 VITALS
WEIGHT: 141 LBS | BODY MASS INDEX: 26.62 KG/M2 | OXYGEN SATURATION: 98 % | RESPIRATION RATE: 16 BRPM | HEART RATE: 76 BPM | DIASTOLIC BLOOD PRESSURE: 80 MMHG | SYSTOLIC BLOOD PRESSURE: 122 MMHG | HEIGHT: 61 IN

## 2025-03-13 DIAGNOSIS — Z87.19 PERSONAL HISTORY OF OTHER DISEASES OF THE DIGESTIVE SYSTEM: ICD-10-CM

## 2025-03-13 DIAGNOSIS — Z86.59 PERSONAL HISTORY OF OTHER MENTAL AND BEHAVIORAL DISORDERS: ICD-10-CM

## 2025-03-13 DIAGNOSIS — K58.9 IRRITABLE BOWEL SYNDROME, UNSPECIFIED: ICD-10-CM

## 2025-03-13 DIAGNOSIS — R94.31 ABNORMAL ELECTROCARDIOGRAM [ECG] [EKG]: ICD-10-CM

## 2025-03-13 DIAGNOSIS — Z78.9 OTHER SPECIFIED HEALTH STATUS: ICD-10-CM

## 2025-03-13 PROCEDURE — 99244 OFF/OP CNSLTJ NEW/EST MOD 40: CPT

## 2025-03-13 PROCEDURE — 93000 ELECTROCARDIOGRAM COMPLETE: CPT

## 2025-03-31 ENCOUNTER — APPOINTMENT (OUTPATIENT)
Dept: CARDIOLOGY | Facility: CLINIC | Age: 65
End: 2025-03-31

## 2025-05-06 ENCOUNTER — APPOINTMENT (OUTPATIENT)
Dept: PAIN MANAGEMENT | Facility: CLINIC | Age: 65
End: 2025-05-06

## 2025-05-06 VITALS — HEIGHT: 61 IN | BODY MASS INDEX: 25.49 KG/M2 | WEIGHT: 135 LBS

## 2025-05-06 DIAGNOSIS — M47.812 SPONDYLOSIS W/OUT MYELOPATHY OR RADICULOPATHY, CERVICAL REGION: ICD-10-CM

## 2025-05-06 DIAGNOSIS — M54.12 RADICULOPATHY, CERVICAL REGION: ICD-10-CM

## 2025-05-06 DIAGNOSIS — M48.02 SPINAL STENOSIS, CERVICAL REGION: ICD-10-CM

## 2025-05-06 PROCEDURE — 99214 OFFICE O/P EST MOD 30 MIN: CPT

## 2025-05-22 ENCOUNTER — APPOINTMENT (OUTPATIENT)
Dept: ORTHOPEDIC SURGERY | Facility: CLINIC | Age: 65
End: 2025-05-22

## 2025-05-22 VITALS — HEIGHT: 61 IN | WEIGHT: 135 LBS | BODY MASS INDEX: 25.49 KG/M2

## 2025-05-22 DIAGNOSIS — M18.11 UNILATERAL PRIMARY OSTEOARTHRITIS OF FIRST CARPOMETACARPAL JOINT, RIGHT HAND: ICD-10-CM

## 2025-05-22 DIAGNOSIS — G56.02 CARPAL TUNNEL SYNDROME, LEFT UPPER LIMB: ICD-10-CM

## 2025-05-22 DIAGNOSIS — M18.12 UNILATERAL PRIMARY OSTEOARTHRITIS OF FIRST CARPOMETACARPAL JOINT, LEFT HAND: ICD-10-CM

## 2025-05-22 DIAGNOSIS — G56.01 CARPAL TUNNEL SYNDROME, RIGHT UPPER LIMB: ICD-10-CM

## 2025-05-22 DIAGNOSIS — M15.8 OTHER POLYOSTEOARTHRITIS: ICD-10-CM

## 2025-05-22 PROCEDURE — 20605 DRAIN/INJ JOINT/BURSA W/O US: CPT | Mod: 50

## 2025-05-22 PROCEDURE — 99203 OFFICE O/P NEW LOW 30 MIN: CPT | Mod: 25

## 2025-05-22 PROCEDURE — 73140 X-RAY EXAM OF FINGER(S): CPT | Mod: RT

## 2025-05-22 PROCEDURE — 73110 X-RAY EXAM OF WRIST: CPT | Mod: 50

## 2025-05-28 ENCOUNTER — APPOINTMENT (OUTPATIENT)
Dept: PAIN MANAGEMENT | Facility: CLINIC | Age: 65
End: 2025-05-28

## 2025-06-09 ENCOUNTER — APPOINTMENT (OUTPATIENT)
Dept: NEUROLOGY | Facility: CLINIC | Age: 65
End: 2025-06-09
Payer: COMMERCIAL

## 2025-06-09 PROCEDURE — 95886 MUSC TEST DONE W/N TEST COMP: CPT

## 2025-06-09 PROCEDURE — 95912 NRV CNDJ TEST 11-12 STUDIES: CPT

## 2025-06-12 ENCOUNTER — APPOINTMENT (OUTPATIENT)
Dept: ORTHOPEDIC SURGERY | Facility: CLINIC | Age: 65
End: 2025-06-12
Payer: COMMERCIAL

## 2025-06-12 PROCEDURE — 99203 OFFICE O/P NEW LOW 30 MIN: CPT

## 2025-06-17 ENCOUNTER — APPOINTMENT (OUTPATIENT)
Dept: PAIN MANAGEMENT | Facility: CLINIC | Age: 65
End: 2025-06-17

## 2025-09-18 ENCOUNTER — APPOINTMENT (OUTPATIENT)
Dept: ORTHOPEDIC SURGERY | Facility: AMBULATORY SURGERY CENTER | Age: 65
End: 2025-09-18